# Patient Record
Sex: FEMALE | Race: WHITE
[De-identification: names, ages, dates, MRNs, and addresses within clinical notes are randomized per-mention and may not be internally consistent; named-entity substitution may affect disease eponyms.]

---

## 2018-01-15 ENCOUNTER — HOSPITAL ENCOUNTER (EMERGENCY)
Dept: HOSPITAL 25 - UCCORT | Age: 48
Discharge: LEFT BEFORE DISCHARGE | End: 2018-01-15
Payer: COMMERCIAL

## 2018-01-15 VITALS — DIASTOLIC BLOOD PRESSURE: 97 MMHG | SYSTOLIC BLOOD PRESSURE: 143 MMHG

## 2018-01-15 DIAGNOSIS — Z90.711: ICD-10-CM

## 2018-01-15 DIAGNOSIS — I10: ICD-10-CM

## 2018-01-15 DIAGNOSIS — Z90.49: ICD-10-CM

## 2018-01-15 DIAGNOSIS — Z85.42: ICD-10-CM

## 2018-01-15 DIAGNOSIS — R10.31: Primary | ICD-10-CM

## 2018-01-15 PROCEDURE — G0463 HOSPITAL OUTPT CLINIC VISIT: HCPCS

## 2018-01-15 PROCEDURE — 81003 URINALYSIS AUTO W/O SCOPE: CPT

## 2018-01-15 PROCEDURE — 99212 OFFICE O/P EST SF 10 MIN: CPT

## 2018-01-15 NOTE — ED
Abdominal Pain/Female





- HPI Summary


HPI Summary: 





47 yr old female with the complaint of abdominal pain. Onset of pain a week ago

, and it comes and goes, located in the right lower abdomen, and right mid side 

abdomen.  Not better or worse with anything.  No NVD.  No constipation. No fever

, chills. No dysuria.  No vaginal bleeding.  She has a history of urterine 

cancer and she has had a DANNY.  She states she still have her ovaries.  Pain 8/

10 when she arrived here, and presently states it is 0/10





- History of Current Complaint


Chief Complaint: UCAbdominalPain


Stated Complaint: RIGHT SIDE ABDOMINAL PAIN


Time Seen by Provider: 01/15/18 17:22


Hx Last Menstrual Period: 11/29/16


Allergies/Adverse Reactions: 


 Allergies











Allergy/AdvReac Type Severity Reaction Status Date / Time


 


No Known Allergies Allergy   Verified 01/15/18 17:12











Home Medications: 


 Home Medications





NK [No Home Medications Reported]  01/15/18 [History Confirmed 01/15/18]











PMH/Surg Hx/FS Hx/Imm Hx





- Cancer History


Cancer Type, Location and Year: Uterine, 2017





- Surgical History


Surgery Procedure, Year, and Place: Partial Hysterectomy (has ovaries), 2017, 

[place], Cholecystectomy,


Infectious Disease History: No


Infectious Disease History: 


   Denies: Traveled Outside the [place] in Last 30 Days





- Family History


Known Family History: Positive: Hypertension





- Social History


Occupation: Employed Full-time


Alcohol Use: None


Substance Use Type: Reports: None


Smoking Status (MU): Never Smoked Tobacco





Review of Systems


Constitutional: Negative


Positive: Abdominal Pain


Positive: flank pain.  Negative: burning, dysuria, discharge


All Other Systems Reviewed And Are Negative: Yes





Physical Exam


Triage Information Reviewed: Yes


Vital Signs On Initial Exam: 


 Initial Vitals











Temp Pulse Resp BP Pulse Ox


 


 98.1 F   96   16   143/97   99 


 


 01/15/18 17:10  01/15/18 17:10  01/15/18 17:10  01/15/18 17:10  01/15/18 17:10











Vital Signs Reviewed: Yes


Appearance: Positive: Well-Appearing, No Pain Distress


Skin: Positive: Warm, Skin Color Reflects Adequate Perfusion


Head/Face: Positive: Normal Head/Face Inspection


Eyes: Positive: EOMI


Dental: Positive: Cellulitis @


Neck: Positive: Nontender


Respiratory/Lung Sounds: Positive: Clear to Auscultation, Breath Sounds Present


Cardiovascular: Positive: RRR.  Negative: Murmur


Abdomen Description: Positive: Other: - tender right lower abdomen


Musculoskeletal: Positive: Strength/ROM Intact


Neurological: Positive: Sensory/Motor Intact, Alert, Oriented to Person Place, 

Time, CN Intact II-III, Normal Gait


Psychiatric: Positive: Normal





- [place] Coma Scale


Best Eye Response: 4 - Spontaneous


Best Motor Response: 6 - Obeys Commands


Best Verbal Response: 5 - Oriented





Diagnostics





- Vital Signs


 Vital Signs











  Temp Pulse Resp BP Pulse Ox


 


 01/15/18 17:10  98.1 F  96  16  143/97  99














- Laboratory


Lab Results: 


 Lab Results











  01/15/18 Range/Units





  17:17 


 


POC Urine Color  Yellow  


 


POC Urine Clarity  Clear  


 


POC Urine pH  6.5  (5-9)  


 


POC Ur Specif Gravity  <= 1.005 L  (1.010-1.030)  


 


POC Urine Protein  Negative  (Negative)  


 


POC Ur Glucose (UA)  Negative  (Negative)  


 


POC Urine Ketones  Negative  (Negative)  


 


POC Urine Blood  Negative  (Negative)  


 


POC Urine Nitrite  Negative  (Negative)  


 


POC Urine Bilirubin  Negative  (Negative)  


 


POC Urine Urobilinogen  0.2  (Negative)  


 


POC U Leukocyte Esteras  Trace H  (Negative)  











Lab Statement: Any lab studies that have been ordered have been reviewed, and 

results considered in the medical decision making process.





Abdominal Pain Fem Course/Dx





- Course


Course Of Treatment: 47 yr old female with right lower abdominal pain on 

palpation.  It is recommended she go to the ER by ambulance in [facility] to not 

delay her care.  She refused transfer by ambulance and signed out AMA.





- Diagnoses


Provider Diagnoses: 


 Abdominal pain, Hypertension








Discharge





- Discharge Plan


Condition: Good


Disposition: AGAINST MEDICAL ADVICE


Referrals: 


Shyam Carter MD [Primary Care Provider] -

## 2019-03-01 ENCOUNTER — HOSPITAL ENCOUNTER (EMERGENCY)
Dept: HOSPITAL 25 - UCCORT | Age: 49
Discharge: HOME | End: 2019-03-01
Payer: COMMERCIAL

## 2019-03-01 VITALS — DIASTOLIC BLOOD PRESSURE: 89 MMHG | SYSTOLIC BLOOD PRESSURE: 138 MMHG

## 2019-03-01 DIAGNOSIS — R10.9: Primary | ICD-10-CM

## 2019-03-01 DIAGNOSIS — R11.0: ICD-10-CM

## 2019-03-01 DIAGNOSIS — R19.7: ICD-10-CM

## 2019-03-01 PROCEDURE — 99211 OFF/OP EST MAY X REQ PHY/QHP: CPT

## 2019-03-01 PROCEDURE — G0463 HOSPITAL OUTPT CLINIC VISIT: HCPCS

## 2019-03-01 NOTE — UC
Abdominal Pain Female HPI





- HPI Summary


HPI Summary: 


48-year-old female presents with a two-month history of intermittent right-

sided abdominal discomfort.  States she feels like there is swelling in her 

abdomen states she thinks she has some bruising along the upper part of her 

abdomen.  She reports occasional diarrhea but recently has been having normal 

formed stools.  Also complains of some intermittent nausea.  She was recently 

seen for same complaint by her OB/GYN, Dr. Carter, who scheduled her for an 

abdominal ultrasound March 5, 2019.  She states that she also went to the 

emergency room at [facility] today where they performed 

blood work, urine test, and an ultrasound the patient states workup was 

negative.  Denies fever, chills, chest pain, shortness of breath, cough, 

vomiting, blood in stool, melena, dysuria, frequency, urgency, hematuria, 

vaginal discharge, or abnormal bleeding.








- History of Current Complaint


Chief Complaint: UCAbdominalPain


Stated Complaint: RT SIDE PAIN/BRUISING


Time Seen by Provider: 03/01/19 13:32


Hx Obtained From: Patient


Hx Last Menstrual Period: 11/29/16


Pain Intensity: 8


Allergies/Adverse Reactions: 


 Allergies











Allergy/AdvReac Type Severity Reaction Status Date / Time


 


No Known Allergies Allergy   Verified 03/01/19 12:20











Home Medications: 


 Home Medications





Naproxen Sodium [Aleve] 440 mg PO BID PRN 03/01/19 [History Confirmed 03/01/19]











PMH/Surg Hx/FS Hx/Imm Hx


Previously Healthy: Yes


GI/ History: Gall Bladder Disease





- Surgical History


Surgical History: Yes


Surgery Procedure, Year, and Place: Partial Hysterectomy (has ovaries), 2017, 

[place], Cholecystectomy,





- Family History


Known Family History: Positive: Hypertension





- Social History


Occupation: Employed Full-time


Lives: Alone


Alcohol Use: None


Substance Use Type: None


Smoking Status (MU): Never Smoked Tobacco





- Immunization History


Most Recent Influenza Vaccination: Not the 2017/2018 Season





Review of Systems


All Other Systems Reviewed And Are Negative: Yes


Constitutional: Negative: Fever, Chills


Skin: Positive: Bruising.  Negative: Rash


ENT: Positive: Negative


Respiratory: Negative: Shortness Of Breath, Cough


Cardiovascular: Negative: Palpitations, Chest Pain


Gastrointestinal: Positive: Abdominal Pain, Nausea.  Negative: Vomiting, 

Diarrhea


Genitourinary: Negative: Dysuria, Hematuria, Frequency, Urgency, Vaginal/Penile 

Discharge, Abnormal Bleeding


Musculoskeletal: Positive: Negative


Neurological: Positive: Negative


Is Patient Immunocompromised?: No





Physical Exam





- Summary


Physical Exam Summary: 


GENERAL APPEARANCE: Well developed, well nourished, alert and cooperative, and 

appears to be in no acute distress.





CARDIAC: Normal S1 and S2. No S3, S4 or murmurs. Rhythm is regular. There is no 

peripheral edema, cyanosis or pallor. Extremities are warm and well perfused. 

Capillary refill is less than 2 seconds. Peripheral pulses intact.





LUNGS: Clear to auscultation without rales, rhonchi, wheezing or diminished 

breath sounds.





ABDOMEN: Positive bowel sounds. Soft, nondistended. Mild RUQ and RLQ tenderness 

without guarding or rebound. No masses or hepatosplenomegally. No CVA 

tenderness.





MUSKULOSKELETAL: ROM intact to all extremities. No joint erythema or 

tenderness. Normal muscular development. Normal gait.





SKIN: Skin normal color, texture and turgor with no lesions or eruptions.





Triage Information Reviewed: Yes


Vital Signs: 


 Initial Vital Signs











Temp  98.2 F   03/01/19 12:16


 


Pulse  84   03/01/19 12:16


 


Resp  17   03/01/19 12:16


 


BP  143/83   03/01/19 12:16


 


Pulse Ox  98   03/01/19 12:16











Vital Signs Reviewed: Yes





Abd Pain Female Course/Dx





- Course


Course Of Treatment: 48-year-old female presents with a two-month history of 

intermittent right-sided abdominal discomfort.  States she feels like there is 

swelling in her abdomen states she thinks she has some bruising along the upper 

part of her abdomen.  She reports occasional diarrhea but recently has been 

having normal formed stools.  Also complains of some intermittent nausea.  She 

was recently seen for same complaint by her OB/GYN, Dr. Carter, who scheduled her 

for an abdominal ultrasound March 5, 2019.  She states that she also went to 

the emergency room at [facility] today where they 

performed blood work, urine test, and an ultrasound the patient states workup 

was negative.  Denies fever, chills, chest pain, shortness of breath, cough, 

vomiting, blood in stool, melena, dysuria, frequency, urgency, hematuria, 

vaginal discharge, or abnormal bleeding.  Afebrile.  Mildly elevated blood 

pressure otherwise vital signs stable.  Patient's physical exam was 

unremarkable except for some mild right upper and right lower quadrant 

tenderness without guarding or rebound.  To ER records from her visit this 

morning were requested however physician's note was not available at this time.

  I did obtain her lab work including a CBC and CMP and UA which were all 

unremarkable.  I also reviewed the abdominal ultrasound that was performed 

which showed no acute pathology although evaluation of her pancreas was limited 

by overlying bowel gas.  I reviewed all these findings with the patient and 

explained that we had limited capacity for performing any further evaluation of 

her abdominal pain. Considering the duration of her symptoms, an essentially 

normal exam, and benign workup performed at the emergency room I do not suspect 

that her abdominal pain represents an emergent condition and I feel that her 

symptoms can be further evaluated on an outpatient basis. She states her 

daughter has made an appointment for her to establish with a primary care 

provider within the next 2 weeks. I have encouraged her to keep this 

appointment. I did offer her the option of outpatient evaluation through the 

[facility] [place] declined stating she did not want to 

travel to [place] for evaluation. I did provide her with the contact information 

for the [facility] physicial referral service if she needed further 

assistance with establishing with a primary care provider. Warning symptoms 

that would required immediate evaluation in the emergency room were reviewed 

with the patient. Verbalizes understanding and agrees with POC.





- Differential Dx/Diagnosis


Differential Diagnosis: Bowel Obstruction, Constipation, Hepatitis, Irritable 

Bowel Syndrome, Pancreatitis, Renal Colic, Urinary Tract Infection


Provider Diagnosis: 


 Abdominal pain








Discharge





- Sign-Out/Discharge


Documenting (check all that apply): Patient Departure


All imaging exams completed and their final reports reviewed: No Studies





- Discharge Plan


Condition: Stable


Disposition: HOME


Patient Education Materials:  Abdominal Pain (ED)


Referrals: 


No Primary Care Phys,NOPCP [Primary Care Provider] - 


[facility] [place]Outside[place]


Additional Instructions: 


I reviewed your lab results and ultrasound report that was performed in the 

emergency room today which were all normal. You exam was unremarkable. Many 

times the cause of abdominal pain cannot be determined however I have a low 

suspicion that your pain is from an emergent condition and can be further 

evaluated on an outpatient basis.





Be sure to keep the appointment with the primary care provider that your 

daughter scheduled for you within the next 2 weeks. I have provided you with 

the contact information for the [facility] physician referral 

service if you need further assistance with establishing with a primary care 

provider.





Seek immediate medical attention in the emergency room if you develop fever 

greater than 100.5 F, have worsening abdominal pain, have persistent or 

projectile vomiting, blood in your vomit or stool, or any worsening of symptoms.





- Billing Disposition and Condition


Condition: STABLE


Disposition: Home





- Attestation Statements


Provider Attestation: 





Per institutional requirements, I have reviewed the chart, however, I was not 

consulted specifically or made aware of this patient by the  midlevel provider.

  I did not personally evaluate, interact with , or disposition  this patient.

## 2019-03-15 ENCOUNTER — HOSPITAL ENCOUNTER (EMERGENCY)
Dept: HOSPITAL 25 - UCCORT | Age: 49
Discharge: HOME | End: 2019-03-15
Payer: COMMERCIAL

## 2019-03-15 VITALS — SYSTOLIC BLOOD PRESSURE: 143 MMHG | DIASTOLIC BLOOD PRESSURE: 95 MMHG

## 2019-03-15 DIAGNOSIS — N64.4: Primary | ICD-10-CM

## 2019-03-15 PROCEDURE — G0463 HOSPITAL OUTPT CLINIC VISIT: HCPCS

## 2019-03-15 PROCEDURE — 99211 OFF/OP EST MAY X REQ PHY/QHP: CPT

## 2019-03-15 NOTE — UC
General HPI





- HPI Summary


HPI Summary: 





L UPPER BREAST TENDERNESS, PRESSURE AND SENSE OF FULLNESS ON AND OFF SINCE 

YESTERDAY.


NO FEVER, RASH OR INJURY.


DENIES ANY DISCHARGE FROM THE BREAST.


STATES LAST MAMMOGRAM WAS ORDERED BY HER GYN, DR CARTER ABOUT 1 YEAR AGO AND WAS 

NORMAL.


DOES SELF BREAST EXAMS REGULARLY.





- History of Current Complaint


Chief Complaint: UCSkin


Stated Complaint: PERSONAL


Time Seen by Provider: 03/15/19 14:40


Hx Obtained From: Patient


Hx Last Menstrual Period: 11/29/16


Pain Intensity: 4





- Allergy/Home Medications


Allergies/Adverse Reactions: 


 Allergies











Allergy/AdvReac Type Severity Reaction Status Date / Time


 


No Known Allergies Allergy   Verified 03/15/19 14:44














PMH/Surg Hx/FS Hx/Imm Hx





- Additional Past Medical History


Additional PMH: 





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





- Surgical History


Surgical History: Yes


Surgery Procedure, Year, and Place: Partial Hysterectomy (has ovaries), 2017, 

[place], Cholecystectomy,





- Family History


Known Family History: Positive: Hypertension





- Social History


Alcohol Use: None


Substance Use Type: None


Smoking Status (MU): Never Smoked Tobacco





- Immunization History


Most Recent Influenza Vaccination: Not the 2017/2018 Season





Review of Systems


All Other Systems Reviewed And Are Negative: Yes





Physical Exam


Triage Information Reviewed: Yes


Appearance: Well-Appearing


Vital Signs: 


 Initial Vital Signs











Temp  98.6 F   03/15/19 14:38


 


Pulse  79   03/15/19 14:38


 


Resp  18   03/15/19 14:38


 


BP  143/95   03/15/19 14:38


 


Pulse Ox  96   03/15/19 14:38











Vital Signs Reviewed: Yes


Eyes: Positive: Conjunctiva Clear


ENT: Positive: Normal ENT inspection


Neck: Positive: Supple, Nontender, No Lymphadenopathy


Respiratory: Positive: Lungs clear, Normal breath sounds


Cardiovascular: Positive: RRR, No Murmur


Abdomen Description: Positive: Nontender, No Organomegaly, Soft


Bowel Sounds: Positive: Present


Musculoskeletal: Positive: ROM Intact


Neurological: Positive: Alert


Psychological: Positive: Age Appropriate Behavior


Skin Exam: Normal, Other - BILATERAL BREAST EXAM: NO SWELLING, RASH OR 

DISCHARGE FROM NIPPLES. L NIPPLE FLAT TO INVERTED WHICH PT DESCRIBES AT NORMAL. 

NO DIMPLING IN SKIN. NO MASS OR SWELLING ON PALPATION TO EITHER BREAST. NO NECK

, CLAVICULAR OR AXILLARY ADENOPATHY.





Course/Dx





- Differential Dx - Multi-Symptom


Differential Diagnoses: Other - NO SIGN OF INFECTION OR MASS. NEED TO F/U WITH 

DR CARTER FOR A RECHECK WAS STRESSED AT TIME OF VISIT TO WHICH PT AGREES. SHE 

WILL GO TO THE ER FOR ANY WORSENING.





- Diagnoses


Provider Diagnosis: 


 Breast pain, left








Discharge





- Sign-Out/Discharge


Documenting (check all that apply): Patient Departure


All imaging exams completed and their final reports reviewed: No Studies





- Discharge Plan


Condition: Stable


Disposition: HOME


Patient Education Materials:  Breast Mass (ED)


Referrals: 


Shyam Carter MD [Medical Doctor] - As Soon As Possible


Additional Instructions: 


SINCE YOUR GYN(DR CARTER) IS CLOSED TODAY, CALL HIM THIS COMING MONDAY TO BE SEEN 

AS SOON AS POSSIBLE





- Billing Disposition and Condition


Condition: STABLE


Disposition: Home